# Patient Record
Sex: FEMALE | Race: WHITE | NOT HISPANIC OR LATINO | URBAN - NONMETROPOLITAN AREA
[De-identification: names, ages, dates, MRNs, and addresses within clinical notes are randomized per-mention and may not be internally consistent; named-entity substitution may affect disease eponyms.]

---

## 2019-11-19 ENCOUNTER — IMPORTED ENCOUNTER (OUTPATIENT)
Dept: URBAN - NONMETROPOLITAN AREA CLINIC 1 | Facility: CLINIC | Age: 49
End: 2019-11-19

## 2019-11-19 PROBLEM — H52.4: Noted: 2019-11-19

## 2019-11-19 PROCEDURE — 92015 DETERMINE REFRACTIVE STATE: CPT

## 2019-11-19 PROCEDURE — 92004 COMPRE OPH EXAM NEW PT 1/>: CPT

## 2019-11-19 NOTE — PATIENT DISCUSSION
Presbyopia-  discussed refractive status w/ pt today-  MR done new GLRx given-  monitor yearly or PRN

## 2022-04-09 ASSESSMENT — TONOMETRY
OD_IOP_MMHG: 11
OS_IOP_MMHG: 11

## 2022-04-09 ASSESSMENT — VISUAL ACUITY
OD_CC: 20/30+
OS_CC: 20/25